# Patient Record
Sex: FEMALE | Race: WHITE | NOT HISPANIC OR LATINO | Employment: STUDENT | URBAN - METROPOLITAN AREA
[De-identification: names, ages, dates, MRNs, and addresses within clinical notes are randomized per-mention and may not be internally consistent; named-entity substitution may affect disease eponyms.]

---
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
## 2021-05-10 ENCOUNTER — PATIENT MESSAGE (OUTPATIENT)
Dept: RESEARCH | Facility: HOSPITAL | Age: 21
End: 2021-05-10

## 2021-09-20 ENCOUNTER — PATIENT MESSAGE (OUTPATIENT)
Dept: PRIMARY CARE CLINIC | Facility: CLINIC | Age: 21
End: 2021-09-20

## 2021-09-20 ENCOUNTER — OFFICE VISIT (OUTPATIENT)
Dept: PRIMARY CARE CLINIC | Facility: CLINIC | Age: 21
End: 2021-09-20
Payer: COMMERCIAL

## 2021-09-20 VITALS
SYSTOLIC BLOOD PRESSURE: 120 MMHG | HEIGHT: 61 IN | WEIGHT: 153.13 LBS | OXYGEN SATURATION: 98 % | TEMPERATURE: 98 F | HEART RATE: 83 BPM | DIASTOLIC BLOOD PRESSURE: 78 MMHG | BODY MASS INDEX: 28.91 KG/M2 | RESPIRATION RATE: 18 BRPM

## 2021-09-20 DIAGNOSIS — Z11.4 ENCOUNTER FOR SCREENING FOR HIV: ICD-10-CM

## 2021-09-20 DIAGNOSIS — K21.9 GASTROESOPHAGEAL REFLUX DISEASE, UNSPECIFIED WHETHER ESOPHAGITIS PRESENT: Primary | ICD-10-CM

## 2021-09-20 DIAGNOSIS — R07.89 CHEST DISCOMFORT: ICD-10-CM

## 2021-09-20 DIAGNOSIS — R09.A2 GLOBUS SENSATION: ICD-10-CM

## 2021-09-20 DIAGNOSIS — Z11.59 NEED FOR HEPATITIS C SCREENING TEST: ICD-10-CM

## 2021-09-20 DIAGNOSIS — Z23 NEED FOR VACCINATION: ICD-10-CM

## 2021-09-20 DIAGNOSIS — F41.9 ANXIETY: ICD-10-CM

## 2021-09-20 PROCEDURE — 90732 PNEUMOCOCCAL POLYSACCHARIDE VACCINE 23-VALENT =>2YO SQ IM: ICD-10-PCS | Mod: S$GLB,,, | Performed by: STUDENT IN AN ORGANIZED HEALTH CARE EDUCATION/TRAINING PROGRAM

## 2021-09-20 PROCEDURE — 99204 OFFICE O/P NEW MOD 45 MIN: CPT | Mod: 25,S$GLB,, | Performed by: STUDENT IN AN ORGANIZED HEALTH CARE EDUCATION/TRAINING PROGRAM

## 2021-09-20 PROCEDURE — 3074F SYST BP LT 130 MM HG: CPT | Mod: CPTII,S$GLB,, | Performed by: STUDENT IN AN ORGANIZED HEALTH CARE EDUCATION/TRAINING PROGRAM

## 2021-09-20 PROCEDURE — 3008F PR BODY MASS INDEX (BMI) DOCUMENTED: ICD-10-PCS | Mod: CPTII,S$GLB,, | Performed by: STUDENT IN AN ORGANIZED HEALTH CARE EDUCATION/TRAINING PROGRAM

## 2021-09-20 PROCEDURE — 1159F PR MEDICATION LIST DOCUMENTED IN MEDICAL RECORD: ICD-10-PCS | Mod: CPTII,S$GLB,, | Performed by: STUDENT IN AN ORGANIZED HEALTH CARE EDUCATION/TRAINING PROGRAM

## 2021-09-20 PROCEDURE — 99999 PR PBB SHADOW E&M-EST. PATIENT-LVL IV: ICD-10-PCS | Mod: PBBFAC,,, | Performed by: STUDENT IN AN ORGANIZED HEALTH CARE EDUCATION/TRAINING PROGRAM

## 2021-09-20 PROCEDURE — 3078F PR MOST RECENT DIASTOLIC BLOOD PRESSURE < 80 MM HG: ICD-10-PCS | Mod: CPTII,S$GLB,, | Performed by: STUDENT IN AN ORGANIZED HEALTH CARE EDUCATION/TRAINING PROGRAM

## 2021-09-20 PROCEDURE — 3074F PR MOST RECENT SYSTOLIC BLOOD PRESSURE < 130 MM HG: ICD-10-PCS | Mod: CPTII,S$GLB,, | Performed by: STUDENT IN AN ORGANIZED HEALTH CARE EDUCATION/TRAINING PROGRAM

## 2021-09-20 PROCEDURE — 1160F RVW MEDS BY RX/DR IN RCRD: CPT | Mod: CPTII,S$GLB,, | Performed by: STUDENT IN AN ORGANIZED HEALTH CARE EDUCATION/TRAINING PROGRAM

## 2021-09-20 PROCEDURE — 99999 PR PBB SHADOW E&M-EST. PATIENT-LVL IV: CPT | Mod: PBBFAC,,, | Performed by: STUDENT IN AN ORGANIZED HEALTH CARE EDUCATION/TRAINING PROGRAM

## 2021-09-20 PROCEDURE — 1160F PR REVIEW ALL MEDS BY PRESCRIBER/CLIN PHARMACIST DOCUMENTED: ICD-10-PCS | Mod: CPTII,S$GLB,, | Performed by: STUDENT IN AN ORGANIZED HEALTH CARE EDUCATION/TRAINING PROGRAM

## 2021-09-20 PROCEDURE — 90471 PNEUMOCOCCAL POLYSACCHARIDE VACCINE 23-VALENT =>2YO SQ IM: ICD-10-PCS | Mod: S$GLB,,, | Performed by: STUDENT IN AN ORGANIZED HEALTH CARE EDUCATION/TRAINING PROGRAM

## 2021-09-20 PROCEDURE — 90732 PPSV23 VACC 2 YRS+ SUBQ/IM: CPT | Mod: S$GLB,,, | Performed by: STUDENT IN AN ORGANIZED HEALTH CARE EDUCATION/TRAINING PROGRAM

## 2021-09-20 PROCEDURE — 90471 IMMUNIZATION ADMIN: CPT | Mod: S$GLB,,, | Performed by: STUDENT IN AN ORGANIZED HEALTH CARE EDUCATION/TRAINING PROGRAM

## 2021-09-20 PROCEDURE — 1159F MED LIST DOCD IN RCRD: CPT | Mod: CPTII,S$GLB,, | Performed by: STUDENT IN AN ORGANIZED HEALTH CARE EDUCATION/TRAINING PROGRAM

## 2021-09-20 PROCEDURE — 3008F BODY MASS INDEX DOCD: CPT | Mod: CPTII,S$GLB,, | Performed by: STUDENT IN AN ORGANIZED HEALTH CARE EDUCATION/TRAINING PROGRAM

## 2021-09-20 PROCEDURE — 3078F DIAST BP <80 MM HG: CPT | Mod: CPTII,S$GLB,, | Performed by: STUDENT IN AN ORGANIZED HEALTH CARE EDUCATION/TRAINING PROGRAM

## 2021-09-20 PROCEDURE — 99204 PR OFFICE/OUTPT VISIT, NEW, LEVL IV, 45-59 MIN: ICD-10-PCS | Mod: 25,S$GLB,, | Performed by: STUDENT IN AN ORGANIZED HEALTH CARE EDUCATION/TRAINING PROGRAM

## 2021-09-20 RX ORDER — PANTOPRAZOLE SODIUM 40 MG/1
40 TABLET, DELAYED RELEASE ORAL DAILY
Qty: 30 TABLET | Refills: 1 | Status: SHIPPED | OUTPATIENT
Start: 2021-09-20 | End: 2022-08-14 | Stop reason: SDUPTHER

## 2021-09-20 RX ORDER — ETONOGESTREL 68 MG/1
68 IMPLANT SUBCUTANEOUS ONCE
COMMUNITY
End: 2023-10-08 | Stop reason: CLARIF

## 2021-09-29 ENCOUNTER — OFFICE VISIT (OUTPATIENT)
Dept: PSYCHIATRY | Facility: CLINIC | Age: 21
End: 2021-09-29
Payer: COMMERCIAL

## 2021-09-29 DIAGNOSIS — F32.5 MAJOR DEPRESSIVE DISORDER WITH SINGLE EPISODE, IN REMISSION: ICD-10-CM

## 2021-09-29 DIAGNOSIS — F41.1 GENERALIZED ANXIETY DISORDER: Primary | ICD-10-CM

## 2021-09-29 PROCEDURE — 1159F PR MEDICATION LIST DOCUMENTED IN MEDICAL RECORD: ICD-10-PCS | Mod: CPTII,95,, | Performed by: NURSE PRACTITIONER

## 2021-09-29 PROCEDURE — 1159F MED LIST DOCD IN RCRD: CPT | Mod: CPTII,95,, | Performed by: NURSE PRACTITIONER

## 2021-09-29 PROCEDURE — 1160F PR REVIEW ALL MEDS BY PRESCRIBER/CLIN PHARMACIST DOCUMENTED: ICD-10-PCS | Mod: CPTII,95,, | Performed by: NURSE PRACTITIONER

## 2021-09-29 PROCEDURE — 90792 PSYCH DIAG EVAL W/MED SRVCS: CPT | Mod: 95,,, | Performed by: NURSE PRACTITIONER

## 2021-09-29 PROCEDURE — 1160F RVW MEDS BY RX/DR IN RCRD: CPT | Mod: CPTII,95,, | Performed by: NURSE PRACTITIONER

## 2021-09-29 PROCEDURE — 90792 PR PSYCHIATRIC DIAGNOSTIC EVALUATION W/MEDICAL SERVICES: ICD-10-PCS | Mod: 95,,, | Performed by: NURSE PRACTITIONER

## 2021-09-29 RX ORDER — TRAZODONE HYDROCHLORIDE 50 MG/1
50 TABLET ORAL NIGHTLY PRN
Qty: 30 TABLET | Refills: 2 | Status: SHIPPED | OUTPATIENT
Start: 2021-09-29 | End: 2023-01-20 | Stop reason: SDUPTHER

## 2021-09-29 RX ORDER — ESCITALOPRAM OXALATE 10 MG/1
10 TABLET ORAL DAILY
Qty: 30 TABLET | Refills: 2 | Status: SHIPPED | OUTPATIENT
Start: 2021-09-29

## 2021-12-29 ENCOUNTER — PATIENT MESSAGE (OUTPATIENT)
Dept: ADMINISTRATIVE | Facility: OTHER | Age: 21
End: 2021-12-29
Payer: COMMERCIAL

## 2022-05-10 NOTE — LETTER
August 22, 2019      [facility] [place] [address], [address]  [facility] [address]  Phone: [phone]  Fax: [phone]       Patient: Yu Abreu   YOB: 2000  Date of Visit: 08/22/2019    To Whom It May Concern:    Zari Abreu  was at [facility] on 08/22/2019. Please excuse for work/school for 2 days unless well enough to return sooner  . If you have any questions or concerns, or if I can be of further assistance, please do not hesitate to contact me.    Sincerely,    Eloy Nash MD     
Sudarshan Park 60 y.o. female presents today with   Chief Complaint   Patient presents with    6 Month Follow-Up    Health Maintenance    Leg Pain     Pt prescribed Gabapentin by pain management asking if RF can be done here       Hyperlipidemia  This is a chronic problem. The current episode started more than 1 year ago. The problem is controlled. Recent lipid tests were reviewed and are normal. She has no history of chronic renal disease. Associated symptoms include leg pain. Pertinent negatives include no chest pain. Hypertension  This is a chronic problem. The current episode started more than 1 year ago. The problem is unchanged. The problem is controlled. Associated symptoms include anxiety. Pertinent negatives include no chest pain or palpitations. There is no history of chronic renal disease. Leg Pain   The incident occurred more than 1 week ago. The incident occurred at home. Injury mechanism: neuropathy. The pain is present in the left leg and right leg. The quality of the pain is described as aching. The pain is at a severity of 4/10. The pain is moderate. The pain has been fluctuating since onset. Associated symptoms include numbness. Treatments tried: neuropathy. The treatment provided mild relief. DVT hx good on anticouagulant    Past Medical History:   Diagnosis Date    Chronic pain of right ankle 2016    twisting fall, arthritis    DVT of deep femoral vein (HCC)     x 2, after surgery knee, other spontanous     Hiatal hernia     History of radiofrequency ablation (RFA) procedure for cardiac arrhythmia     SVT    Hx of blood clots     Hypertension     Leg pain, right     ulcers    Low back pain radiating to right leg     Neuropathy, leg     Osteoarthritis     Peptic ulcer 2011    cause of anemia    Pulmonary emboli ([address] 2001    x 2, right leg, left leg    Venous ulcer of right leg (HCC)     chronic pain     There are no active problems to display for this patient.     Past
Surgical History:   Procedure Laterality Date     SECTION      JOINT REPLACEMENT Bilateral      Family History   Problem Relation Age of Onset    Stroke Mother     Breast Cancer Mother     High Blood Pressure Mother     Stroke Father     High Blood Pressure Father      Social History     Socioeconomic History    Marital status:      Spouse name: Not on file    Number of children: Not on file    Years of education: Not on file    Highest education level: Not on file   Occupational History    Not on file   Social Needs    Financial resource strain: Not hard at all   [place] insecurity     Worry: Never true     Inability: Never true   [employer] needs     Medical: Not on file     Non-medical: Not on file   Tobacco Use    Smoking status: Never Smoker    Smokeless tobacco: Never Used   Substance and Sexual Activity    Alcohol use: No    Drug use: No    Sexual activity: Not on file   Lifestyle    Physical activity     Days per week: Not on file     Minutes per session: Not on file    Stress: Not on file   Relationships    Social connections     Talks on phone: Not on file     Gets together: Not on file     Attends Alevism service: Not on file     Active member of club or organization: Not on file     Attends meetings of clubs or organizations: Not on file     Relationship status: Not on file    Intimate partner violence     Fear of current or ex partner: Not on file     Emotionally abused: Not on file     Physically abused: Not on file     Forced sexual activity: Not on file   Other Topics Concern    Not on file   Social History Narrative    Not on file     No Known Allergies    Review of Systems   Constitutional: Negative for chills and fever. HENT: Negative for congestion and nosebleeds. Eyes: Negative for photophobia and visual disturbance. Respiratory: Negative for apnea and cough. Cardiovascular: Positive for leg swelling.  Negative for chest pain and
palpitations. Gastrointestinal: Negative for abdominal distention and blood in stool. Genitourinary: Negative for enuresis and hematuria. Musculoskeletal: Positive for arthralgias. Negative for gait problem and joint swelling. Skin: Negative for rash. Neurological: Positive for numbness. Negative for syncope and speech difficulty. Hematological: Does not bruise/bleed easily. Psychiatric/Behavioral: Negative for hallucinations and suicidal ideas. Vitals:    04/05/21 0831   BP: 112/70   Pulse: 64   Temp: 98.5 °F (36.9 °C)   SpO2: 99%   Weight: 200 lb (90.7 kg)   Height: 5' 4\" (1.626 m)       Physical Exam  Constitutional:       Appearance: She is well-developed. HENT:      Head: Normocephalic and atraumatic. Eyes:      Pupils: Pupils are equal, round, and reactive to light. Neck:      Musculoskeletal: Normal range of motion. Cardiovascular:      Rate and Rhythm: Normal rate and regular rhythm. Heart sounds: Normal heart sounds. Pulmonary:      Effort: No respiratory distress. Breath sounds: Normal breath sounds. Abdominal:      General: There is no distension. Musculoskeletal: Normal range of motion. Neurological:      Mental Status: She is alert and oriented to person, place, and time. Psychiatric:         Mood and Affect: Mood normal.         Assessment/Plan  Jayesh Cruz was seen today for 6 month follow-up, health maintenance and leg pain. Diagnoses and all orders for this visit:    Pure hypercholesterolemia  -     atorvastatin (LIPITOR) 10 MG tablet; Take 1 tablet by mouth daily  -     Lipid Panel; Future  -     Comprehensive Metabolic Panel; Future    Essential hypertension  -     metoprolol succinate (TOPROL XL) 50 MG extended release tablet; Take 1 tablet by mouth daily  -     CBC With Auto Differential; Future  -     Comprehensive Metabolic Panel;  Future    Chronic deep vein thrombosis (DVT) of both lower extremities, unspecified vein (HCC)  -     warfarin
(COUMADIN) 10 MG tablet; Take 1 tablet by mouth daily  -     Protime-INR; Future    Localized edema  -     spironolactone (ALDACTONE) 25 MG tablet; TAKE 1 TABLET BY MOUTH EVERY DAY    Neuropathy  -     gabapentin (NEURONTIN) 300 MG capsule; Take 1 capsule by mouth 3 times daily for 90 days. Encounter for screening mammogram for breast cancer    Peptic ulcer  -     omeprazole (PRILOSEC) 40 MG delayed release capsule; Take 1 capsule by mouth daily    Iron deficiency anemia, unspecified iron deficiency anemia type  -     CBC With Auto Differential; Future    Hypothyroidism, unspecified type  -     TSH with Reflex; Future    Screening mammogram for high-risk patient  -     SANDHYA DIGITAL SCREEN W OR WO CAD BILATERAL; Future    Colon cancer screening  -     Cologuard; Future        No follow-ups on file.     Mariano Bell MD
nothing

## 2022-08-14 DIAGNOSIS — K21.9 GASTROESOPHAGEAL REFLUX DISEASE, UNSPECIFIED WHETHER ESOPHAGITIS PRESENT: ICD-10-CM

## 2022-08-14 NOTE — TELEPHONE ENCOUNTER
Care Due:                  Date            Visit Type   Department     Provider  --------------------------------------------------------------------------------                                NP -                              PRIMARY      [facility] [place] [place] [facility]  Last Visit: 09-      CARE ([facility])   PRIMARY CARE   Ramy Urena  Next Visit: None Scheduled  None         None Found                                                            Last  Test          Frequency    Reason                     Performed    Due Date  --------------------------------------------------------------------------------    Office Visit  12 months..  pantoprazole.............  09-   09-    [facility] Embedded Care Gaps. Reference number: [id number]. 8/14/2022   4:17:34 PM CDT

## 2022-08-15 RX ORDER — PANTOPRAZOLE SODIUM 40 MG/1
40 TABLET, DELAYED RELEASE ORAL DAILY
Qty: 30 TABLET | Refills: 4 | Status: SHIPPED | OUTPATIENT
Start: 2022-08-15

## 2022-12-07 ENCOUNTER — TELEPHONE (OUTPATIENT)
Dept: OBSTETRICS AND GYNECOLOGY | Facility: CLINIC | Age: 22
End: 2022-12-07
Payer: COMMERCIAL

## 2023-01-20 ENCOUNTER — OFFICE VISIT (OUTPATIENT)
Dept: PSYCHIATRY | Facility: CLINIC | Age: 23
End: 2023-01-20
Payer: COMMERCIAL

## 2023-01-20 DIAGNOSIS — F32.5 MAJOR DEPRESSIVE DISORDER WITH SINGLE EPISODE, IN REMISSION: ICD-10-CM

## 2023-01-20 DIAGNOSIS — F41.1 GENERALIZED ANXIETY DISORDER: Primary | ICD-10-CM

## 2023-01-20 DIAGNOSIS — G47.00 INSOMNIA, UNSPECIFIED TYPE: ICD-10-CM

## 2023-01-20 PROCEDURE — 99214 PR OFFICE/OUTPT VISIT, EST, LEVL IV, 30-39 MIN: ICD-10-PCS | Mod: 95,,, | Performed by: NURSE PRACTITIONER

## 2023-01-20 PROCEDURE — 99214 OFFICE O/P EST MOD 30 MIN: CPT | Mod: 95,,, | Performed by: NURSE PRACTITIONER

## 2023-01-20 RX ORDER — TRAZODONE HYDROCHLORIDE 50 MG/1
50 TABLET ORAL NIGHTLY PRN
Qty: 30 TABLET | Refills: 11 | Status: SHIPPED | OUTPATIENT
Start: 2023-01-20

## 2023-01-20 NOTE — PROGRESS NOTES
"1/20/2023 11:00 AM  Yu Abreu   2000   [id number]                                                              OUTPATIENT PSYCHIATRY FOLLOW UP VISIT        Yu Abreu, a 22 y.o. female, presenting for initial evaluation visit. Met with patient.     Reason for Encounter: self-referral. Patient complains of anxiety     History of Present Illness:      Patient reports "I have trouble going to sleep on time." Marin has been getting "stressed out at work." Has been working at [employer] for about 10/11 months and an [employer], training for a  position. Marin has been living with stepsritesh and stepbrother.     Marin had been on Lexapro and had some pupil dilation, so this was discontinued, unsure if interacted with another medication.     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 Reports "don't really have much time for a personal life." Free time, "just home hanging out."     Marin has been drinking coffee with extra shots of espresso, and smoking weed daily. Discussed cutting back on caffeine and THC, patient defers at this time in lieu of trazodone. We discussed lifestyle changes as well.     Denies SI/HI, racing thoughts, jj, other rec. Substance use.      Psychosocial stressors: occupational, family, social      Psychiatric Review Of Systems - Is patient experiencing or having changes in:     Symptoms of Depression: Reports diminished mood or loss of interest/anhedonia, irritability, diminished energy, change in sleep, change in appetite, diminished concentration or cognition or indecisiveness, excessive guilt or hopelessness or worthlessness and suicidal ideations Denies no symptoms of depression -  Reports passive/Active SI  Onset was approximately several years ago. Symptoms have been gradually worsening since that time.        Symptoms of LUIS FELIPE: Reports excessive anxiety/worry/fear, more days than not, " "about numerous issues, difficult to control, with restlessness, fatigue, poor concentration, irritability, muscle tension, sleep disturbance and causes functionally impairing distress Denies no anxiety symptoms  Onset was approximately several years ago. Symptoms have been gradually worsening since that time.        Symptoms of jj or hypomania: No elevated, expansive, or irritable mood with increased energy or activity; with inflated self-esteem or grandiosity, decreased need for sleep, increased rate of speech,  racing thoughts, distractibility, increased goal directed activity or PMA, risky/disinhibited behavior        Symptoms of psychosis: No hallucinations, delusions, disorganized thinking, disorganized behavior or abnormal motor behavior, or negative symptoms (diminshed emotional expression, avolition, anhedonia, alogia, asociality         Sleep: Reports "some nights I'll wake up for an hour and then go back to sleep." Reports "since I haven't been working I've been sleeping until noon and going to sleep at 3 or 4 am in the morning."     Risk Parameters:  Patient reports no suicidal ideation  Patient reports no homicidal ideation  Patient reports no self-injurious behavior  Patient reports no violent behavior     Other symptoms     Symptoms of Panic Disorder: No recurrent panic attacks, precipitated or un-precipitated, source of worry and/or behavioral changes secondary; with or without agoraphobia.      Reports "I've had a few panic attacks in the past few months."     Symptoms of PTSD: No h/o trauma; re-experiencing/intrusive symptoms, avoidant behavior, negative alterations in cognition or mood, or hyperarousal symptoms; with or without dissociative symptoms      Symptoms of OCD: No obsessions, compulsions or ruminations. Reports was feeling suicidal but "this got unstuck." Reports "worry about random things." Reports "If my mom takes longer to get home from work, I know its my mind messing with me."   "   Symptoms of Eating Disorders: No anorexia, bulimia or binging.      Symptoms of ADHD:No inattention or hyperactivity     Substance Use:   Denies        Psychotropic Medication Review:  Previous Trials-     Current meds-     Previous Psychiatric Hospitalizations:      HISTORY                 Past Medical History:   Diagnosis Date    ADHD (attention deficit hyperactivity disorder)      Anxiety      Depression              History of Seizures or TBI: denies           Past Surgical History:   Procedure Laterality Date    TONSILLECTOMY   2015               Family History   Problem Relation Age of Onset    Other Mother           prev dx schizophrenia, likely 2/2 hormone issues    No Known Problems Father      Diabetes Maternal Grandfather      Heart disease Maternal Grandfather      Lung cancer Maternal Grandfather      Stroke Maternal Grandmother      Hypertension Maternal Grandmother      Melanoma Neg Hx      Breast cancer Neg Hx      Colon cancer Neg Hx           Social History            Socioeconomic History    Marital status: Single       Spouse name: Not on file    Number of children: Not on file    Years of education: Not on file    Highest education level: Not on file   Occupational History    Not on file   Tobacco Use    Smoking status: Former Smoker       Types: Cigarettes, Vaping w/o nicotine    Smokeless tobacco: Never Used   Substance and Sexual Activity    Alcohol use: Yes       Comment: about 10 drinks in a week    Drug use: Not Currently       Types: Marijuana    Sexual activity: Yes       Partners: Female, Male       Birth control/protection: Implant   Other Topics Concern    Are you pregnant or think you may be? Not Asked    Breast-feeding Not Asked   Social History Narrative    Not on file      Social Determinants of Health          Financial Resource Strain:     Difficulty of Paying Living Expenses:    Food Insecurity:     Worried About Running Out of Food in the Last Year:     Ran Out of Food in  the Last Year:    Transportation Needs:     Lack of Transportation (Medical):     Lack of Transportation (Non-Medical):    Physical Activity:     Days of Exercise per Week:     Minutes of Exercise per Session:    Stress:     Feeling of Stress :    Social Connections:     Frequency of Communication with Friends and Family:     Frequency of Social Gatherings with Friends and Family:     Attends Temple Services:     Active Member of Clubs or Organizations:     Attends Club or Organization Meetings:     Marital Status:          Psychiatric History:  Anxiety and depression, hx of ADHD in the past.      Additional Psychiatric Family History:  Mother - bipolar disorder, no medication currently   Father -   Brother      Social History:  [place], living in [place] with mother currently, for about 1 year.Older brother and older sisters, half brother. Works full-time. Some tobacco smoking, mostly vaping currently. Getting along with mom. Good relationship with dad currently. Reports hangs out with Shanita or Kevin. No senior care or arrests.          Guns or other weapons in the home:  no     OBJECTIVE         Constitutional  Vitals:  Most recent vital signs, dated less than 90 days prior to this appointment, were reviewed.    There were no vitals filed for this visit.            Laboratory Data: Reviewed most recent      Medications:  Encounter Medications          Outpatient Encounter Medications as of 9/29/2021   Medication Sig Dispense Refill    albuterol (PROVENTIL/VENTOLIN HFA) 90 mcg/actuation inhaler Inhale 2 puffs into the lungs every 6 (six) hours as needed for Wheezing. Rescue 18 g 0    etonogestreL (NEXPLANON) 68 mg Impl subdermal device 68 mg by Subdermal route once. A single NEXPLANON implant is inserted subdermally just under the skin at the inner side of the non-dominant upper arm.        pantoprazole (PROTONIX) 40 MG tablet Take 1 tablet (40 mg total) by mouth once daily. 30 tablet 1      No  "facility-administered encounter medications on file as of 9/29/2021.            Allergy:  Review of patient's allergies indicates:  No Known Allergies     Nutritional Screening: Considering the patient's height and weight, medications, medical history and preferences, should a referral be made to the dietitian? no     Review of Systems:  General: unremarkable, age appropriate  Resp:  No shortness of breath, hyperventilation or cough  Cvs:  No tachycardia or chest pain  Gi:  No nausea, vomiting, pain, constipation or diarrhea  Musculoskeletal:  No pain or stiffness of the joints  Muscle Strength/Tone:not examined  Neurological:  No weakness, sensory changes, seizures, confusion, memory loss, tremor or other abnormal movements   Gait & Station:non-ataxic     AIMS:  n/a *     Mental Status Exam:  Appearance: unremarkable, age appropriate, casually dressed  Behavior/Cooperation:appropriate friendly and cooperative   Speech: appropriate rate, volume and tone spontaneous   Language: uses words appropriately; NO aphasia or dysarthria  Mood: " down "  Affect:  full, congruent with mood and appropriate to situation/content.  Thought Process:  normal and logical  Thought Content: normal, no suicidality, no homicidality, delusions, or paranoia  Sensorium:  Awake  Alert and Oriented: x3 grossly intact  Memory: Intact to conversation both recent and remote  Attention/concentration: appropriate for age/education.   Insight: Intact - fair  Judgment:Intact           Strengths and Liabilities: Strength: Patient accepts guidance/feedback     ASSESSMENT      Impression: LUIS FELIPE                        Major Depressive Disorder versus Mood Disorder, NOS versus Bipolar Disorder NOS                             Treatment Goals:  Specify outcomes written in observable, behavioral terms:   Anxiety: acquiring relapse prevention skills, reducing negative automatic thoughts, reducing physical symptoms of anxiety and reducing time spent worrying " (<30 minutes/day)  Depression: acquiring relapse prevention skills, increasing energy, increasing interest in usual activities, increasing motivation, increasing self-reward for positive behaviors (one/day), increasing self-reward for positive thoughts (one/day), increasing social contacts (three/week), reducing excessive guilt, reducing fatigue and reducing negative automatic thoughts     TREATMENT PLAN      Medication Management:   Start trazodone 50 mg by mouth once daily at bedtime  Labs: reviewed most recent labs  The treatment plan and follow up plan were reviewed with the patient.  Discussed with patient informed consent, risks vs. benefits, alternative treatments, side effect profile and the inherent unpredictability of individual responses to these treatments. The patient expresses understanding of the above and displays the capacity to agree with this current plan and had no other questions.  Encouraged Patient to keep future appointments.   Take medications as prescribed and abstain from substance abuse.   In the event of an emergency patient was advised to go to the emergency room.  Referral for further treatment to social work team for psychotherapy     Return to Clinic:  6 weeks, 1 month     > than 50% of total time spend on coordination of care and counseling   (which included pts differential diagnosis and prognosis for psychiatric conditions, risks, benefits of treatments, instructions and adherence to treatment plan, risk reduction, reviewing current psychiatric medication regimen, medical problems and social stressors. In addtion to possible discussion with other healthcare provider/s)     Add on Psychotherapy time: 0  Total Face to face time: 20 min     The patient location is: [place], at home  The chief complaint leading to consultation is: establish care     Visit type: audiovisual     Face to Face time with patient: 20 min  25 minutes of total time spent on the encounter, which includes  face to face time and non-face to face time preparing to see the patient (eg, review of tests), Obtaining and/or reviewing separately obtained history, Documenting clinical information in the electronic or other health record, Independently interpreting results (not separately reported) and communicating results to the patient/family/caregiver, or Care coordination (not separately reported).            Each patient to whom he or she provides medical services by telemedicine is:  (1) informed of the relationship between the physician and patient and the respective role of any other health care provider with respect to management of the patient; and (2) notified that he or she may decline to receive medical services by telemedicine and may withdraw from such care at any time.     Notes:      Saravanan Gray, MSN, APRN, PMHNP-BC  [facility]   1/20/2023 11:00 AM

## 2023-05-17 ENCOUNTER — OFFICE VISIT (OUTPATIENT)
Dept: PSYCHIATRY | Facility: CLINIC | Age: 23
End: 2023-05-17
Payer: COMMERCIAL

## 2023-05-17 DIAGNOSIS — F90.0 ADHD (ATTENTION DEFICIT HYPERACTIVITY DISORDER), INATTENTIVE TYPE: ICD-10-CM

## 2023-05-17 DIAGNOSIS — F32.5 MAJOR DEPRESSIVE DISORDER WITH SINGLE EPISODE, IN REMISSION: ICD-10-CM

## 2023-05-17 DIAGNOSIS — F41.1 GENERALIZED ANXIETY DISORDER: Primary | ICD-10-CM

## 2023-05-17 PROCEDURE — 99214 PR OFFICE/OUTPT VISIT, EST, LEVL IV, 30-39 MIN: ICD-10-PCS | Mod: 95,,, | Performed by: NURSE PRACTITIONER

## 2023-05-17 PROCEDURE — 99214 OFFICE O/P EST MOD 30 MIN: CPT | Mod: 95,,, | Performed by: NURSE PRACTITIONER

## 2023-05-17 RX ORDER — ATOMOXETINE 40 MG/1
40 CAPSULE ORAL DAILY
Qty: 30 CAPSULE | Refills: 5 | Status: SHIPPED | OUTPATIENT
Start: 2023-05-17

## 2023-05-17 NOTE — PROGRESS NOTES
"5/17/2023 2:00 PM  Yu Abreu   2000   [id number]                                                              OUTPATIENT PSYCHIATRY FOLLOW UP VISIT        Yu Abreu, a 23 y.o. female, presenting for initial evaluation visit. Met with patient.     Reason for Encounter: self-referral. Patient complains of anxiety     History of Present Illness:      Patient reports has been having some difficulty with anxiety, depression and ADHD. Reports "personal life is fine and just living life." Working job at waiting tables for 6 months and this has been working ok. Thinking about future options and considering going back to school.     We discussed trial of non-stimulant, Strattera, since has been having issues with focus, attention and also reports some THC use currently.      Denies SI/HI, racing thoughts, jj, other rec. Substance use.     Informed patient I will be leaving [facility] and the need to schedule with another provider. Encouraged to schedule at conclusion of this visit to insure a timely appointment and avoid a gap in treatment including medication management. Patient verbalized understanding.       Psychosocial stressors: occupational, family, social      Psychiatric Review Of Systems - Is patient experiencing or having changes in:     Symptoms of Depression: Reports diminished mood or loss of interest/anhedonia, irritability, diminished energy, change in sleep, change in appetite, diminished concentration or cognition or indecisiveness, excessive guilt or hopelessness or worthlessness and suicidal ideations Denies no symptoms of depression -  Reports passive/Active SI  Onset was approximately several years ago. Symptoms have been gradually worsening since that time.        Symptoms of LUIS FELIPE: Reports excessive anxiety/worry/fear, more days than not, about numerous issues, difficult to control, with restlessness, fatigue, poor concentration, irritability, muscle tension, sleep disturbance and causes " "functionally impairing distress Denies no anxiety symptoms  Onset was approximately several years ago. Symptoms have been gradually worsening since that time.        Symptoms of jj or hypomania: No elevated, expansive, or irritable mood with increased energy or activity; with inflated self-esteem or grandiosity, decreased need for sleep, increased rate of speech,  racing thoughts, distractibility, increased goal directed activity or PMA, risky/disinhibited behavior        Symptoms of psychosis: No hallucinations, delusions, disorganized thinking, disorganized behavior or abnormal motor behavior, or negative symptoms (diminshed emotional expression, avolition, anhedonia, alogia, asociality         Sleep: Reports 8 to 9 hours of sleep daily     Risk Parameters:  Patient reports no suicidal ideation  Patient reports no homicidal ideation  Patient reports no self-injurious behavior  Patient reports no violent behavior     Other symptoms     Symptoms of Panic Disorder: No recurrent panic attacks, precipitated or un-precipitated, source of worry and/or behavioral changes secondary; with or without agoraphobia.      Reports "I've had a few panic attacks in the past few months."     Symptoms of PTSD: No h/o trauma; re-experiencing/intrusive symptoms, avoidant behavior, negative alterations in cognition or mood, or hyperarousal symptoms; with or without dissociative symptoms      Symptoms of OCD: No obsessions, compulsions or ruminations. Reports was feeling suicidal but "this got unstuck." Reports "worry about random things." Reports "If my mom takes longer to get home from work, I know its my mind messing with me."     Symptoms of Eating Disorders: No anorexia, bulimia or binging.      Symptoms of ADHD:No inattention or hyperactivity     Substance Use:   Denies        Psychotropic Medication Review:  Previous Trials-   Lexapro - "bad reaction."  Current meds-      Previous Psychiatric Hospitalizations: none     HISTORY "                    Past Medical History:   Diagnosis Date    ADHD (attention deficit hyperactivity disorder)      Anxiety      Depression              History of Seizures or TBI: denies               Past Surgical History:   Procedure Laterality Date    TONSILLECTOMY   2015                   Family History   Problem Relation Age of Onset    Other Mother           prev dx schizophrenia, likely 2/2 hormone issues    No Known Problems Father      Diabetes Maternal Grandfather      Heart disease Maternal Grandfather      Lung cancer Maternal Grandfather      Stroke Maternal Grandmother      Hypertension Maternal Grandmother      Melanoma Neg Hx      Breast cancer Neg Hx      Colon cancer Neg Hx           Social History                Socioeconomic History    Marital status: Single       Spouse name: Not on file    Number of children: Not on file    Years of education: Not on file    Highest education level: Not on file   Occupational History    Not on file   Tobacco Use    Smoking status: Former Smoker       Types: Cigarettes, Vaping w/o nicotine    Smokeless tobacco: Never Used   Substance and Sexual Activity    Alcohol use: Yes       Comment: about 10 drinks in a week    Drug use: Not Currently       Types: Marijuana    Sexual activity: Yes       Partners: Female, Male       Birth control/protection: Implant   Other Topics Concern    Are you pregnant or think you may be? Not Asked    Breast-feeding Not Asked   Social History Narrative    Not on file      Social Determinants of Health            Financial Resource Strain:     Difficulty of Paying Living Expenses:    Food Insecurity:     Worried About Running Out of Food in the Last Year:     Ran Out of Food in the Last Year:    Transportation Needs:     Lack of Transportation (Medical):     Lack of Transportation (Non-Medical):    Physical Activity:     Days of Exercise per Week:     Minutes of Exercise per Session:    Stress:     Feeling of Stress :    Social  Connections:     Frequency of Communication with Friends and Family:     Frequency of Social Gatherings with Friends and Family:     Attends Gnosticist Services:     Active Member of Clubs or Organizations:     Attends Club or Organization Meetings:     Marital Status:          Psychiatric History:  Anxiety and depression, hx of ADHD in the past.      Additional Psychiatric Family History:  Mother - bipolar disorder, no medication currently   Father -   Brother      Social History:  [place] living with step siblings, for about 1 year.Older brother and older sisters, half brother. Works full-time. Some tobacco smoking, mostly vaping currently. Getting along with mom. Good relationship with dad currently. Reports hangs out with Shanita or Kevin. No correction or arrests.          Guns or other weapons in the home:  no     OBJECTIVE         Constitutional  Vitals:  Most recent vital signs, dated less than 90 days prior to this appointment, were reviewed.    There were no vitals filed for this visit.            Laboratory Data: Reviewed most recent      Medications:  Encounter Medications               Outpatient Encounter Medications as of 9/29/2021   Medication Sig Dispense Refill    albuterol (PROVENTIL/VENTOLIN HFA) 90 mcg/actuation inhaler Inhale 2 puffs into the lungs every 6 (six) hours as needed for Wheezing. Rescue 18 g 0    etonogestreL (NEXPLANON) 68 mg Impl subdermal device 68 mg by Subdermal route once. A single NEXPLANON implant is inserted subdermally just under the skin at the inner side of the non-dominant upper arm.        pantoprazole (PROTONIX) 40 MG tablet Take 1 tablet (40 mg total) by mouth once daily. 30 tablet 1      No facility-administered encounter medications on file as of 9/29/2021.            Allergy:  Review of patient's allergies indicates:  No Known Allergies     Nutritional Screening: Considering the patient's height and weight, medications, medical history and  "preferences, should a referral be made to the dietitian? no     Review of Systems:  General: unremarkable, age appropriate  Resp:  No shortness of breath, hyperventilation or cough  Cvs:  No tachycardia or chest pain  Gi:  No nausea, vomiting, pain, constipation or diarrhea  Musculoskeletal:  No pain or stiffness of the joints  Muscle Strength/Tone:not examined  Neurological:  No weakness, sensory changes, seizures, confusion, memory loss, tremor or other abnormal movements   Gait & Station:non-ataxic     AIMS:  n/a *     Mental Status Exam:  Appearance: unremarkable, age appropriate, casually dressed  Behavior/Cooperation:appropriate friendly and cooperative   Speech: appropriate rate, volume and tone spontaneous   Language: uses words appropriately; NO aphasia or dysarthria  Mood: " down "  Affect:  full, congruent with mood and appropriate to situation/content.  Thought Process:  normal and logical  Thought Content: normal, no suicidality, no homicidality, delusions, or paranoia  Sensorium:  Awake  Alert and Oriented: x3 grossly intact  Memory: Intact to conversation both recent and remote  Attention/concentration: appropriate for age/education.   Insight: Intact - fair  Judgment:Intact           Strengths and Liabilities: Strength: Patient accepts guidance/feedback     ASSESSMENT      Impression: LUIS FELIPE                        Major Depressive Disorder versus Mood Disorder, NOS versus Bipolar Disorder NOS                             Treatment Goals:  Specify outcomes written in observable, behavioral terms:   Anxiety: acquiring relapse prevention skills, reducing negative automatic thoughts, reducing physical symptoms of anxiety and reducing time spent worrying (<30 minutes/day)  Depression: acquiring relapse prevention skills, increasing energy, increasing interest in usual activities, increasing motivation, increasing self-reward for positive behaviors (one/day), increasing self-reward for positive thoughts " (one/day), increasing social contacts (three/week), reducing excessive guilt, reducing fatigue and reducing negative automatic thoughts     TREATMENT PLAN      Medication Management:   Start trazodone 50 mg by mouth once daily at bedtime  Start atomoxetine 40 mg by mouth once daily  Labs: reviewed most recent labs  The treatment plan and follow up plan were reviewed with the patient.  Discussed with patient informed consent, risks vs. benefits, alternative treatments, side effect profile and the inherent unpredictability of individual responses to these treatments. The patient expresses understanding of the above and displays the capacity to agree with this current plan and had no other questions.  Encouraged Patient to keep future appointments.   Take medications as prescribed and abstain from substance abuse.   In the event of an emergency patient was advised to go to the emergency room.  Referral for further treatment to social work team for psychotherapy     Return to Clinic:  6 weeks, 1 month     > than 50% of total time spend on coordination of care and counseling   (which included pts differential diagnosis and prognosis for psychiatric conditions, risks, benefits of treatments, instructions and adherence to treatment plan, risk reduction, reviewing current psychiatric medication regimen, medical problems and social stressors. In addtion to possible discussion with other healthcare provider/s)     Add on Psychotherapy time: 0  Total Face to face time: 20 min     The patient location is: [place], at home  The chief complaint leading to consultation is: establish care     Visit type: audiovisual     Face to Face time with patient: 20 min  25 minutes of total time spent on the encounter, which includes face to face time and non-face to face time preparing to see the patient (eg, review of tests), Obtaining and/or reviewing separately obtained history, Documenting clinical information in the electronic or other  health record, Independently interpreting results (not separately reported) and communicating results to the patient/family/caregiver, or Care coordination (not separately reported).            Each patient to whom he or she provides medical services by telemedicine is:  (1) informed of the relationship between the physician and patient and the respective role of any other health care provider with respect to management of the patient; and (2) notified that he or she may decline to receive medical services by telemedicine and may withdraw from such care at any time.     Notes:      Saravanan Gray, MSN, APRN, PMHNP-BC [facility]   5/17/2023 2:00 PM

## 2023-06-19 ENCOUNTER — PATIENT MESSAGE (OUTPATIENT)
Dept: PSYCHIATRY | Facility: CLINIC | Age: 23
End: 2023-06-19
Payer: COMMERCIAL

## 2023-10-18 ENCOUNTER — PATIENT MESSAGE (OUTPATIENT)
Dept: CARDIOLOGY | Facility: CLINIC | Age: 23
End: 2023-10-18
Payer: COMMERCIAL

## 2024-02-14 ENCOUNTER — PATIENT OUTREACH (OUTPATIENT)
Dept: ADMINISTRATIVE | Facility: HOSPITAL | Age: 24
End: 2024-02-14
Payer: COMMERCIAL

## 2024-02-14 NOTE — PROGRESS NOTES
[facility] panel list reviewed. Patient moved to [place] and established with a new provider.     Dr. Urena removed as PCP.

## 2024-05-02 ENCOUNTER — OFFICE VISIT (OUTPATIENT)
Dept: OBSTETRICS AND GYNECOLOGY | Facility: CLINIC | Age: 24
End: 2024-05-02
Payer: COMMERCIAL

## 2024-05-02 VITALS
SYSTOLIC BLOOD PRESSURE: 124 MMHG | DIASTOLIC BLOOD PRESSURE: 84 MMHG | WEIGHT: 199.19 LBS | BODY MASS INDEX: 36.43 KG/M2

## 2024-05-02 DIAGNOSIS — Z01.419 WELL WOMAN EXAM WITH ROUTINE GYNECOLOGICAL EXAM: Primary | ICD-10-CM

## 2024-05-02 PROCEDURE — 99999 PR PBB SHADOW E&M-EST. PATIENT-LVL III: CPT | Mod: PBBFAC,,, | Performed by: OBSTETRICS & GYNECOLOGY

## 2024-05-02 PROCEDURE — 3074F SYST BP LT 130 MM HG: CPT | Mod: CPTII,S$GLB,, | Performed by: OBSTETRICS & GYNECOLOGY

## 2024-05-02 PROCEDURE — 3008F BODY MASS INDEX DOCD: CPT | Mod: CPTII,S$GLB,, | Performed by: OBSTETRICS & GYNECOLOGY

## 2024-05-02 PROCEDURE — 88175 CYTOPATH C/V AUTO FLUID REDO: CPT | Performed by: OBSTETRICS & GYNECOLOGY

## 2024-05-02 PROCEDURE — 1159F MED LIST DOCD IN RCRD: CPT | Mod: CPTII,S$GLB,, | Performed by: OBSTETRICS & GYNECOLOGY

## 2024-05-02 PROCEDURE — 1160F RVW MEDS BY RX/DR IN RCRD: CPT | Mod: CPTII,S$GLB,, | Performed by: OBSTETRICS & GYNECOLOGY

## 2024-05-02 PROCEDURE — 99385 PREV VISIT NEW AGE 18-39: CPT | Mod: S$GLB,,, | Performed by: OBSTETRICS & GYNECOLOGY

## 2024-05-02 PROCEDURE — 3079F DIAST BP 80-89 MM HG: CPT | Mod: CPTII,S$GLB,, | Performed by: OBSTETRICS & GYNECOLOGY

## 2024-05-02 RX ORDER — NORETHINDRONE ACETATE AND ETHINYL ESTRADIOL .02; 1 MG/1; MG/1
1 TABLET ORAL DAILY
Qty: 30 TABLET | Refills: 11 | Status: SHIPPED | OUTPATIENT
Start: 2024-05-02 | End: 2025-05-02

## 2024-05-02 NOTE — PROGRESS NOTES
HPI:   23 y.o.   OB History          0    Para   0    Term   0       0    AB   0    Living   0         SAB   0    IAB   0    Ectopic   0    Multiple   0    Live Births   0              No LMP recorded.    Patient is  here for her annual gynecologic exam.  She has no complaints.     ROS:  GENERAL: No fever, chills, fatigability or weight loss.  SKIN: No rashes, itching or changes in color or texture of skin.  HEAD: No headaches or recent head trauma.  EYES: Visual acuity fine. No photophobia, ocular pain or diplopia.  EARS: Denies ear pain, discharge or vertigo.  NOSE: No loss of smell, no epistaxis or postnasal drip.  MOUTH & THROAT: No hoarseness or change in voice. No excessive gum bleeding.  NODES: Denies swollen glands.  CHEST: Denies MOORE, cyanosis, wheezing, cough and sputum production.  CARDIOVASCULAR: Denies chest pain, PND, orthopnea or reduced exercise tolerance.  ABDOMEN: Appetite fine. No weight loss. Denies diarrhea, abdominal pain, hematemesis or blood in stool.  URINARY: No flank pain, dysuria or hematuria.  PERIPHERAL VASCULAR: No claudication or cyanosis.  MUSCULOSKELETAL: No joint stiffness or swelling. Denies back pain.  NEUROLOGIC: No history of seizures, paralysis, alteration of gait or coordination.    PE:   /84   Wt 90.4 kg (199 lb 3 oz)   BMI 36.43 kg/m²   APPEARANCE: Well nourished, well developed, in no acute distress.  NECK: Neck symmetric without masses or thyromegaly.  BREASTS: Symmetrical, no skin changes or visible lesions. No palpable masses, nipple discharge or adenopathy bilaterally.  ABDOMEN: Flat. Soft. No tenderness or masses. No hepatosplenomegaly. No hernias. No CVA tenderness.  VULVA: No lesions. Normal female genitalia.  URETHRAL MEATUS: Normal size and location, no lesions, no prolapse.  URETHRA: No masses, tenderness, prolapse or scarring.  VAGINA: Moist and well rugated, no discharge, no significant cystocele or rectocele.  CERVIX: No lesions and  discharge. PAP done.  UTERUS: Normal size, regular shape, mobile, non-tender, bladder base nontender.  ADNEXA: No masses, tenderness or CDS nodularity.  ANUS PERINEUM: Normal.    PROCEDURES:  Pap smear    Assessment:  Normal Gynecologic Exam    Plan:  Mammogram and Colonoscopy if indicated by current recommendations.  Return to clinic in one year or for any problems or complaints.  Has nexplanon, wants out  Will start ocp  Sched removal here [place] soon

## 2024-05-03 ENCOUNTER — TELEPHONE (OUTPATIENT)
Dept: OBSTETRICS AND GYNECOLOGY | Facility: CLINIC | Age: 24
End: 2024-05-03
Payer: COMMERCIAL

## 2024-05-03 NOTE — TELEPHONE ENCOUNTER
Left message informing patient that Dr Pickard would like to remove her nexplanon in [place] in about 4 weeks

## 2024-05-06 RX ORDER — NORETHINDRONE ACETATE AND ETHINYL ESTRADIOL .02; 1 MG/1; MG/1
1 TABLET ORAL DAILY
Qty: 30 TABLET | Refills: 11 | OUTPATIENT
Start: 2024-05-06 | End: 2025-05-06

## 2024-05-06 NOTE — TELEPHONE ENCOUNTER
Refill Decision Note   Yu Abreu  is requesting a refill authorization.  Brief Assessment and Rationale for Refill:  Quick Discontinue     Medication Therapy Plan:  Receipt confirmed by pharmacy (5/2/2024  1:53 PM CDT)      Comments:     Note composed:1:49 AM 05/06/2024

## 2024-06-27 ENCOUNTER — OFFICE VISIT (OUTPATIENT)
Dept: OBSTETRICS AND GYNECOLOGY | Facility: CLINIC | Age: 24
End: 2024-06-27
Payer: COMMERCIAL

## 2024-06-27 VITALS — BODY MASS INDEX: 37 KG/M2 | WEIGHT: 202.25 LBS

## 2024-06-27 DIAGNOSIS — Z30.46 NEXPLANON REMOVAL: Primary | ICD-10-CM

## 2024-06-27 PROCEDURE — 99499 UNLISTED E&M SERVICE: CPT | Mod: S$GLB,,, | Performed by: OBSTETRICS & GYNECOLOGY

## 2024-06-27 PROCEDURE — 11982 REMOVE DRUG IMPLANT DEVICE: CPT | Mod: S$GLB,,, | Performed by: OBSTETRICS & GYNECOLOGY

## 2024-06-27 PROCEDURE — 99999 PR PBB SHADOW E&M-EST. PATIENT-LVL II: CPT | Mod: PBBFAC,,, | Performed by: OBSTETRICS & GYNECOLOGY

## 2024-06-27 NOTE — PROCEDURES
Removal of Nexplanon Device    Date/Time: 6/27/2024 2:15 PM    Performed by: Wiedemann, Michael A., MD  Authorized by: Wiedemann, Michael A., MD    Consent obtained:  Prior to procedure the appropriate consent was completed and verified  Consent given by:  Patient  Procedure risks and benefits discussed: yes    Patient questions answered: yes    Patient agrees, verbalizes understanding, and wants to proceed: yes    Educational handouts given: no    Instructions and paperwork completed: yes    Implant grasped by: hemostat  Removed with no complications: yes     Easy nexplanon removal  Small incision, mira well  Min bleeding  Discussed care/activity  Update me prn  Wants to start ocp, already given  Removal due to expiration: yes    Arm: left arm  Palpation confirms location: yes  Small stab incision was made in arm: yes  Upon removal device was intact: yes  Site was close with steri-strips and pressure bandage applied: yes  Local anesthetic:  Lidocaine 1%   The site was cleaned  and prepped in a sterile fashion: yes  Specimen sent to pathology: Yes

## 2024-06-28 ENCOUNTER — TELEPHONE (OUTPATIENT)
Dept: OBSTETRICS AND GYNECOLOGY | Facility: CLINIC | Age: 24
End: 2024-06-28
Payer: COMMERCIAL

## 2024-06-28 NOTE — TELEPHONE ENCOUNTER
----- Message from Mellissa Mcgee sent at 6/28/2024  2:37 PM CDT -----  Type:  Needs Medical Advice    Who Called:  pt   Symptoms (please be specific): redness /rash   How long has patient had these symptoms:  not itching / not burning/ no visible signs of a infection     Would the patient rather a call back or a response via [facility]?  Call back   Best Call Back Number: [phone]  Additional Information:  pt is concerned and would like to discuss with someone in this office pt has  had her Nexplanon removed  on 06/24/24

## 2024-09-19 ENCOUNTER — PATIENT MESSAGE (OUTPATIENT)
Dept: PRIMARY CARE CLINIC | Facility: CLINIC | Age: 24
End: 2024-09-19
Payer: COMMERCIAL

## 2025-07-02 ENCOUNTER — RESULTS FOLLOW-UP (OUTPATIENT)
Dept: FAMILY MEDICINE | Facility: CLINIC | Age: 25
End: 2025-07-02

## 2025-07-02 ENCOUNTER — PATIENT MESSAGE (OUTPATIENT)
Dept: FAMILY MEDICINE | Facility: CLINIC | Age: 25
End: 2025-07-02

## 2025-07-02 ENCOUNTER — OFFICE VISIT (OUTPATIENT)
Dept: FAMILY MEDICINE | Facility: CLINIC | Age: 25
End: 2025-07-02
Payer: COMMERCIAL

## 2025-07-02 ENCOUNTER — LAB VISIT (OUTPATIENT)
Dept: LAB | Facility: HOSPITAL | Age: 25
End: 2025-07-02
Attending: INTERNAL MEDICINE
Payer: COMMERCIAL

## 2025-07-02 VITALS
HEIGHT: 62 IN | DIASTOLIC BLOOD PRESSURE: 74 MMHG | HEART RATE: 90 BPM | SYSTOLIC BLOOD PRESSURE: 111 MMHG | WEIGHT: 196.19 LBS | BODY MASS INDEX: 36.1 KG/M2

## 2025-07-02 DIAGNOSIS — Z00.00 ANNUAL PHYSICAL EXAM: ICD-10-CM

## 2025-07-02 DIAGNOSIS — F12.90 MARIJUANA USE: ICD-10-CM

## 2025-07-02 DIAGNOSIS — F10.90 ALCOHOL USE: ICD-10-CM

## 2025-07-02 DIAGNOSIS — Z00.00 ANNUAL PHYSICAL EXAM: Primary | ICD-10-CM

## 2025-07-02 DIAGNOSIS — E66.9 OBESITY (BMI 35.0-39.9 WITHOUT COMORBIDITY): ICD-10-CM

## 2025-07-02 DIAGNOSIS — F17.200 TOBACCO DEPENDENCY: ICD-10-CM

## 2025-07-02 LAB
ABSOLUTE EOSINOPHIL (OHS): 0.26 K/UL
ABSOLUTE MONOCYTE (OHS): 0.84 K/UL (ref 0.3–1)
ABSOLUTE NEUTROPHIL COUNT (OHS): 6.63 K/UL (ref 1.8–7.7)
BASOPHILS # BLD AUTO: 0.1 K/UL
BASOPHILS NFR BLD AUTO: 0.9 %
CHOLEST SERPL-MCNC: 181 MG/DL (ref 120–199)
CHOLEST/HDLC SERPL: 3.5 {RATIO} (ref 2–5)
EAG (OHS): 94 MG/DL (ref 68–131)
ERYTHROCYTE [DISTWIDTH] IN BLOOD BY AUTOMATED COUNT: 13.2 % (ref 11.5–14.5)
HBA1C MFR BLD: 4.9 % (ref 4–5.6)
HCT VFR BLD AUTO: 47.1 % (ref 37–48.5)
HDLC SERPL-MCNC: 51 MG/DL (ref 40–75)
HDLC SERPL: 28.2 % (ref 20–50)
HGB BLD-MCNC: 15 GM/DL (ref 12–16)
IMM GRANULOCYTES # BLD AUTO: 0.04 K/UL (ref 0–0.04)
IMM GRANULOCYTES NFR BLD AUTO: 0.4 % (ref 0–0.5)
LDLC SERPL CALC-MCNC: 98.4 MG/DL (ref 63–159)
LYMPHOCYTES # BLD AUTO: 2.97 K/UL (ref 1–4.8)
MCH RBC QN AUTO: 28.7 PG (ref 27–31)
MCHC RBC AUTO-ENTMCNC: 31.8 G/DL (ref 32–36)
MCV RBC AUTO: 90 FL (ref 82–98)
NONHDLC SERPL-MCNC: 130 MG/DL
NUCLEATED RBC (/100WBC) (OHS): 0 /100 WBC
PLATELET # BLD AUTO: 228 K/UL (ref 150–450)
PMV BLD AUTO: 13.4 FL (ref 9.2–12.9)
RBC # BLD AUTO: 5.23 M/UL (ref 4–5.4)
RELATIVE EOSINOPHIL (OHS): 2.4 %
RELATIVE LYMPHOCYTE (OHS): 27.4 % (ref 18–48)
RELATIVE MONOCYTE (OHS): 7.7 % (ref 4–15)
RELATIVE NEUTROPHIL (OHS): 61.2 % (ref 38–73)
TRIGL SERPL-MCNC: 158 MG/DL (ref 30–150)
TSH SERPL-ACNC: 1.8 UIU/ML (ref 0.4–4)
WBC # BLD AUTO: 10.84 K/UL (ref 3.9–12.7)

## 2025-07-02 PROCEDURE — 3078F DIAST BP <80 MM HG: CPT | Mod: CPTII,S$GLB,, | Performed by: INTERNAL MEDICINE

## 2025-07-02 PROCEDURE — 85025 COMPLETE CBC W/AUTO DIFF WBC: CPT

## 2025-07-02 PROCEDURE — 1160F RVW MEDS BY RX/DR IN RCRD: CPT | Mod: CPTII,S$GLB,, | Performed by: INTERNAL MEDICINE

## 2025-07-02 PROCEDURE — 80061 LIPID PANEL: CPT

## 2025-07-02 PROCEDURE — 3008F BODY MASS INDEX DOCD: CPT | Mod: CPTII,S$GLB,, | Performed by: INTERNAL MEDICINE

## 2025-07-02 PROCEDURE — 36415 COLL VENOUS BLD VENIPUNCTURE: CPT | Mod: PN

## 2025-07-02 PROCEDURE — 83036 HEMOGLOBIN GLYCOSYLATED A1C: CPT

## 2025-07-02 PROCEDURE — 99395 PREV VISIT EST AGE 18-39: CPT | Mod: S$GLB,,, | Performed by: INTERNAL MEDICINE

## 2025-07-02 PROCEDURE — 84443 ASSAY THYROID STIM HORMONE: CPT

## 2025-07-02 PROCEDURE — 1159F MED LIST DOCD IN RCRD: CPT | Mod: CPTII,S$GLB,, | Performed by: INTERNAL MEDICINE

## 2025-07-02 PROCEDURE — 99999 PR PBB SHADOW E&M-EST. PATIENT-LVL III: CPT | Mod: PBBFAC,,, | Performed by: INTERNAL MEDICINE

## 2025-07-02 PROCEDURE — 3074F SYST BP LT 130 MM HG: CPT | Mod: CPTII,S$GLB,, | Performed by: INTERNAL MEDICINE

## 2025-07-02 NOTE — PROGRESS NOTES
Subjective:       Patient ID: Yu Abreu is a 25 y.o. female.    History of Present Illness    CHIEF COMPLAINT:  Yu presents for an annual wellness visit and to establish care with a new doctor. She also requests evaluation of a mass on her tailbone.    HPI:  Chief Complaint: Establish Care and Mass (On tailbone )      Preventive care:-    1.- Tdap vaccination last given on 10/08/2023   2.- Pap smear pending   3.- chlamydia screening pending   4.-HIV/ hepatitis-C screening completed on 09/2021.   5.-pneumonia vaccination last 1 in 2021.   6.-flu vaccination last in 2010 recommended   7.-lipid panel ordered   8.-diabetes screening ordered   9.-dental pending   10.-ophthalmic examination pending    Yu reports a cyst in the left gluteal tailbone region causing pain and pruritus for approximately 2 weeks. She has a history of this cyst, with a previously recommended surgery that was not pursued. The cyst has recurred as anticipated. She also discloses another cyst on her bikini line. She reveals a family history of hidradenitis suppurativa (HS) and reports her cysts worsened last year. She expresses familiarity with HS management, noting her mother's lifelong condition.    She reports past episodes of dyspnea, which she now attributes to acid reflux. She self-manages this condition. She also mentions occasional knee pain, particularly after prolonged work shifts as a .    Regarding menstrual cycles, she reports irregularity and worsening dysmenorrhea. Her last menstrual period was on June 15th. She is considering reinitiating oral contraceptives to regulate her cycles and potentially alleviate her worsening cramps.    She discloses anxiety, which she believes contributes to her elevated heart rate during medical visits. She describes this anxiety as affecting her beyond just medical settings.    She denies chest pain, cough, phlegm, mucus, heartburn, finger numbness, skin rashes, water retention or  "swelling in her legs before menstrual cycles, and pain or discomfort in her abdomen. She denies hypertension, hypercholesterolemia, thyroid problems, or diabetes.    MEDICATIONS:  Yu previously used Microgestin (birth control pill) but is not currently taking it. She also discontinued ADHD medication in the past, which had caused weight loss.    MEDICAL HISTORY:  Yu has a history of acid reflux, anxiety, and hidradenitis suppurativa (HS). Yu received a tetanus vaccine in 2023, which is valid until 2033. She received a pneumonia vaccine in 2021 and a flu vaccine in 2010. Yu's last menstrual period was on June 15th. She is not currently using any contraception, though she previously used Microgestin (birth control pill). Yu is not sexually active and denies the possibility of pregnancy. She has never been pregnant (G0).    FAMILY HISTORY:  Family history is significant for mother with drug-induced schizophrenia. Her maternal grandfather has a history of depression and heart disease. The maternal grandmother has hypertension and a history of stroke. Yu's grandmother ("Sejal") also has a history of stroke.    TEST RESULTS:  In 2021, the patient underwent several labs. Her cholesterol panel showed a total cholesterol of 189 mg/dL, LDL of 87 mg/dL, and HDL of 76 mg/dL. The general chemistry revealed an elevated bilirubin level, while fasting glucose, liver tests, and electrolytes (sodium, potassium) were normal. Her complete blood count was normal. Hepatitis C and HIV screenings were negative. The thyroid function test was also normal.    SOCIAL HI  STORY:  Alcohol: 3-4 drinks, 3 times per week  Smoking: Current smoker  Marijuana: Current user Occupation:  at two [employer], works about 6 days a week, 8 hour shifts    Marital status: Single, not dating    ROS:  General: -fever, -chills, -fatigue, -weight gain, -weight loss  Cardiovascular: -chest pain, " "-palpitations, -lower extremity edema  Respiratory: -cough, +shortness of breath  Gastrointestinal: -abdominal pain, -nausea, -vomiting, -diarrhea, -constipation, -blood in stool, +heartburn, +indigestion  Skin: -rash, -lesion, +lumps/masses  Neurological: -headache, -dizziness, -numbness, -tingling  Psychiatric: +anxiety  Female Genitourinary: +absent or irregular periods, +menstrual pain or symptoms, +excessive cramping  Musculoskeletal: +joint pain, +pain with movement, +limb pain         Past Medical History:   Diagnosis Date    ADHD (attention deficit hyperactivity disorder)     Anxiety     Depression      Social History[1]  Past Surgical History:   Procedure Laterality Date    TONSILLECTOMY  2015     Family History   Problem Relation Name Age of Onset    Other Mother          prev dx schizophrenia, likely 2/2 hormone issues    No Known Problems Father      No Known Problems Brother      Stroke Maternal Grandmother      Hypertension Maternal Grandmother      Diabetes Maternal Grandfather      Heart disease Maternal Grandfather      Lung cancer Maternal Grandfather      Melanoma Neg Hx      Breast cancer Neg Hx      Colon cancer Neg Hx         Objective:      Physical Exam  Exam conducted with a chaperone present (Ms Oropeza and Ms chavira).   Constitutional:       Appearance: She is obese.      Comments: BMI of 35.89   Musculoskeletal:        Back:    Neurological:      Mental Status: She is alert.       Blood pressure 111/74, pulse 90, height 5' 2" (1.575 m), weight 89 kg (196 lb 3.4 oz), last menstrual period 06/15/2025. Body mass index is 35.89 kg/m².  Physical Exam    Vitals: Height: 5 feet 2 inches. Weight: 167 lbs. Blood pressure: 111/74. Pulse: 90-94. SpO2: 98%.  General: No acute distress. Well-developed. Well-nourished.  Eyes: EOMI. Sclerae anicteric.  Cardiovascular: Regular rate. Regular rhythm. No murmurs. No rubs. No gallops. Normal S1, S2.  Respiratory: Normal respiratory effort. Clear to " auscultation bilaterally. No rales. No rhonchi. No wheezing.  Abdomen soft.  No rigidity or rebound.  Musculoskeletal: No  obvious deformity. Knee valgus deformity (Right). Knee valgus deformity (Left).  Extremities: No lower extremity edema.  Neurological: Alert & oriented   Psychiatric:  Appropriate but intermittently guarded   Skin: Warm. Dry. Cyst in left gluteal/tailbone region with pain and itching.          Patient seems to have another cyst on the bikini line.  She requested this to be seen by the surgeon only.    Assessment:       Lab Visit on 07/02/2025   Component Date Value Ref Range Status    Hemoglobin A1c 07/02/2025 4.9  4.0 - 5.6 % Final    Estimated Average Glucose 07/02/2025 94  68 - 131 mg/dL Final    Cholesterol Total 07/02/2025 181  120 - 199 mg/dL Final    Triglyceride 07/02/2025 158 (H)  30 - 150 mg/dL Final    HDL Cholesterol 07/02/2025 51  40 - 75 mg/dL Final    LDL Cholesterol 07/02/2025 98.4  63.0 - 159.0 mg/dL Final    HDL/Cholesterol Ratio 07/02/2025 28.2  20.0 - 50.0 % Final    Cholesterol/HDL Ratio 07/02/2025 3.5  2.0 - 5.0 Final    Non HDL Cholesterol 07/02/2025 130  mg/dL Final    TSH 07/02/2025 1.801  0.400 - 4.000 uIU/mL Final    WBC 07/02/2025 10.84  3.90 - 12.70 K/uL Final    RBC 07/02/2025 5.23  4.00 - 5.40 M/uL Final    HGB 07/02/2025 15.0  12.0 - 16.0 gm/dL Final    HCT 07/02/2025 47.1  37.0 - 48.5 % Final    MCV 07/02/2025 90  82 - 98 fL Final    MCH 07/02/2025 28.7  27.0 - 31.0 pg Final    MCHC 07/02/2025 31.8 (L)  32.0 - 36.0 g/dL Final    RDW 07/02/2025 13.2  11.5 - 14.5 % Final    Platelet Count 07/02/2025 228  150 - 450 K/uL Final    MPV 07/02/2025 13.4 (H)  9.2 - 12.9 fL Final    Nucleated RBC 07/02/2025 0  <=0 /100 WBC Final    Neut % 07/02/2025 61.2  38 - 73 % Final    Lymph % 07/02/2025 27.4  18 - 48 % Final    Mono % 07/02/2025 7.7  4 - 15 % Final    Eos % 07/02/2025 2.4  <=8 % Final    Basophil % 07/02/2025 0.9  <=1.9 % Final    Imm Grans % 07/02/2025 0.4  0.0 -  0.5 % Final    Neut # 07/02/2025 6.63  1.8 - 7.7 K/uL Final    Lymph # 07/02/2025 2.97  1 - 4.8 K/uL Final    Mono # 07/02/2025 0.84  0.3 - 1 K/uL Final    Eos # 07/02/2025 0.26  <=0.5 K/uL Final    Baso # 07/02/2025 0.10  <=0.2 K/uL Final    Imm Grans # 07/02/2025 0.04  0.00 - 0.04 K/uL Final     Component      Latest Ref Rng 9/20/2021   WBC      3.90 - 12.70 K/uL 10.50    RBC      4.00 - 5.40 M/uL 5.00    Hemoglobin      12.0 - 16.0 g/dL 14.3    Hematocrit      37.0 - 48.5 % 42.6    MCV      82 - 98 fL 85    MCH      27.0 - 31.0 pg 28.6    MCHC      32.0 - 36.0 g/dL 33.5    RDW      11.5 - 14.5 % 14.4    Platelet Count      150 - 450 K/uL 201    MPV      9.2 - 12.9 fL 9.5    Gran # (ANC)      1.8 - 7.7 K/uL 6.9    Lymph #      1.0 - 4.8 K/uL 2.7    Mono #      0.3 - 1.0 K/uL 0.7    Eos #      0.0 - 0.5 K/uL 0.1    Baso #      0.00 - 0.20 K/uL 0.10    Gran %      38.0 - 73.0 % 66.0    Lymph %      18.0 - 48.0 % 25.5    Mono %      4.0 - 15.0 % 6.2    Eos %      0.0 - 8.0 % 1.4    Basophil %      0.0 - 1.9 % 0.9    Differential Method Automated    Sodium      136 - 145 mmol/L 139    Potassium      3.5 - 5.1 mmol/L 3.6    Chloride      95 - 110 mmol/L 109    CO2      23 - 29 mmol/L 19 (L)    Glucose      70 - 110 mg/dL 106    BUN      6 - 20 mg/dL 11    Creatinine      0.5 - 1.4 mg/dL 0.7    Calcium      8.7 - 10.5 mg/dL 10.2    PROTEIN TOTAL      6.0 - 8.4 g/dL 7.8    Albumin      3.5 - 5.2 g/dL 4.3    BILIRUBIN TOTAL      0.1 - 1.0 mg/dL 2.7 (H)    ALP      55 - 135 U/L 76    AST      10 - 40 U/L 16    ALT      10 - 44 U/L 8 (L)    Anion Gap      8 - 16 mmol/L 11    eGFR if African American      >60 mL/min/1.73 m^2 >60.0    eGFR if non African American      >60 mL/min/1.73 m^2 >60.0    Cholesterol Total      120 - 199 mg/dL 189    Triglycerides      30 - 150 mg/dL 126    HDL      40 - 75 mg/dL 76 (H)    LDL Cholesterol      63.0 - 159.0 mg/dL 87.8    HDL/Cholesterol Ratio      20.0 - 50.0 % 40.2    Total  Cholesterol/HDL Ratio      2.0 - 5.0  2.5    Non-HDL Cholesterol      mg/dL 113    HIV 1/2 Ag/Ab      Negative  Negative    Hepatitis C Ab      Negative  Negative    TSH      0.400 - 4.000 uIU/mL 1.062    Free T4      0.71 - 1.51 ng/dL 1.00       Legend:  (L) Low  (H) High    Assessment & Plan              Plan:   Annual physical exam  Comments:  Annual physical done.  Screening labs ordered.  Healthy measures including diet, exercise discussed.  Quit smoking, review alcohol use.  Orders:  -     Cancel: Lipid Panel; Future; Expected date: 07/03/2025  -     Cancel: TSH; Future; Expected date: 07/03/2025  -     Cancel: Hemoglobin A1C; Future; Expected date: 07/03/2025  -     Cancel: CBC Auto Differential; Future; Expected date: 07/03/2025  -     CBC Auto Differential; Future; Expected date: 07/02/2025  -     Hemoglobin A1C; Future; Expected date: 07/02/2025  -     Lipid Panel; Future; Expected date: 07/02/2025  -     TSH; Future; Expected date: 07/02/2025  -     Ambulatory referral/consult to General Surgery; Future; Expected date: 07/16/2025    Tobacco dependency  Comments:  Advised to quit smoking.  Any help required will be provided.  If concerns about weight gain, will discuss.    Obesity (BMI 35.0-39.9 without comorbidity)  Comments:  Weight management strategies discussed.  Previously was taking Vyvanse for weight loss rather than ADD    Alcohol use  Comments:  2-3 drinks 3 times a week.  Unable to make a judgment but almost probably bordering on potential dependency    Marijuana use  Comments:  No judgment made on this.  If it is a reflection of stress and anxiety, those issues need to be addressed.      Patient's 1st appointment with the our clinic.  General life issues, living issues, job, future health concerns and current health concerns reviewed and discussed.  Screening labs ordered and will be discussed with patient's especially concerning risk factors for blood pressure, diabetes or future  problems.  Given her smoking he maybe a candidate for pneumonia vaccine and will consider the discussion.  She is updated on Tdap vaccination.  She will get her gynecological checkup and discussion about hormonal regulation.  First order of business would be to quit smoking and any help required will be provided.  If there is a weight gain as a result of smoking will work on that with counseling and pharmacological intervention if necessary.  Alcohol use has been reviewed and I feel that it is bordering on overuse and problem.  She needs to cut down on that eventually.  I have made no judgment on marijuana use but if she is using for deflecting some of the issues of anxiety and stress, that may need to be addressed in future.  She does have a cystic lump which might be a lipoma and she would like it to be removed and will make a surgical referral.  It seems she has another lump with the pelvic area which she chooses to be seen by the surgeon at this point and will defer the same.  While follow up is in 1 year time, early follow-up as maybe needed for any other interim issues like cough, cold, flu symptoms, minor GI symptoms or minor strains.  If she would like to have a referral for tobacco cessation program, will be happy to provide though at this point she has not made a decision concerning the same.  If there are any issues of obesity management on weight gain, she can come in earlier.  Follow up in about 1 year (around 7/2/2026), or if symptoms worsen or fail to improve, for Preventive Physical.    Current Medications[2]    This note was generated with the assistance of ambient listening technology. Verbal consent was obtained by the patient and accompanying visitor(s) for the recording of patient appointment to facilitate this note. I attest to having reviewed and edited the generated note for accuracy, though some syntax or spelling errors may persist. Please contact the author of this note for any  clarification.      Yoan Barreto     Addendum:-    Labs reviewed and conveyed to patient      Your labs are available now.  Cholesterol and LDL level are within range though there is some elevation in triglycerides which is a type of fat which comes from sugars, carbohydrates and starches.  Please watch for the sugars, carbohydrates and starches.  Weight management important.  A loss of 3 or 4 lb of weight will be helpful.    Blood counts including anemia is within range with minor variations noted in labs like MCHC and MPV and they are not significant to be worried about.    TSH or the thyroid test is within range indicating normal metabolism status.    Hemoglobin A1c is 4.9 which is excellent and indicates no evidence of diabetes.      Let me know if any questions.  Please try to quit smoking.  Please work on cutting down alcohol also.      Dr. Mary Lou LOUIS       [1]   Social History  Socioeconomic History    Marital status: Single    Number of children: 0   Occupational History    Occupation: [employer]     Comment: [employer]   Tobacco Use    Smoking status: Every Day     Types: Cigarettes     Passive exposure: Current    Smokeless tobacco: Never   Substance and Sexual Activity    Alcohol use: Yes     Alcohol/week: 2.0 - 3.0 standard drinks of alcohol     Types: 2 - 3 Shots of liquor per week     Comment: 2-3 times per week    Drug use: Yes     Types: Marijuana    Sexual activity: Not Currently     Partners: Female, Male     Birth control/protection: Implant     Comment: in past   Social History Narrative    ** Merged History Encounter **          Social Drivers of Health     Financial Resource Strain: Medium Risk (7/2/2025)    Overall Financial Resource Strain (CARDIA)     Difficulty of Paying Living Expenses: Somewhat hard   Food Insecurity: No Food Insecurity (7/2/2025)    Hunger Vital Sign     Worried About Running Out of Food in the Last Year: Never true     Ran Out of Food in the Last Year: Never true    Transportation Needs: No Transportation Needs (7/2/2025)    PRAPARE - Transportation     Lack of Transportation (Medical): No     Lack of Transportation (Non-Medical): No   Physical Activity: Sufficiently Active (7/2/2025)    Exercise Vital Sign     Days of Exercise per Week: 6 days     Minutes of Exercise per Session: 150+ min   Stress: Stress Concern Present (7/2/2025)    [place] [employer] - Occupational Stress Questionnaire     Feeling of Stress : To some extent   Housing Stability: Low Risk  (7/2/2025)    Housing Stability Vital Sign     Unable to Pay for Housing in the Last Year: No     Number of Times Moved in the Last Year: 1     Homeless in the Last Year: No   [2] No current outpatient medications on file.

## 2025-07-03 ENCOUNTER — PATIENT MESSAGE (OUTPATIENT)
Dept: FAMILY MEDICINE | Facility: CLINIC | Age: 25
End: 2025-07-03
Payer: COMMERCIAL

## 2025-07-28 ENCOUNTER — TELEPHONE (OUTPATIENT)
Dept: SURGERY | Facility: CLINIC | Age: 25
End: 2025-07-28
Payer: COMMERCIAL

## 2025-07-28 NOTE — TELEPHONE ENCOUNTER
Left message to advise that her insurance is out of network with our office and she would be a cash pay patient for this appointment.